# Patient Record
Sex: FEMALE | Race: WHITE | NOT HISPANIC OR LATINO | URBAN - METROPOLITAN AREA
[De-identification: names, ages, dates, MRNs, and addresses within clinical notes are randomized per-mention and may not be internally consistent; named-entity substitution may affect disease eponyms.]

---

## 2023-05-21 ENCOUNTER — WEB ENCOUNTER (OUTPATIENT)
Dept: URBAN - METROPOLITAN AREA CLINIC 60 | Facility: CLINIC | Age: 28
End: 2023-05-21

## 2023-05-24 ENCOUNTER — OFFICE VISIT (OUTPATIENT)
Dept: URBAN - METROPOLITAN AREA CLINIC 60 | Facility: CLINIC | Age: 28
End: 2023-05-24
Payer: COMMERCIAL

## 2023-05-24 VITALS
BODY MASS INDEX: 27.75 KG/M2 | HEART RATE: 108 BPM | SYSTOLIC BLOOD PRESSURE: 124 MMHG | TEMPERATURE: 98 F | OXYGEN SATURATION: 96 % | WEIGHT: 147 LBS | DIASTOLIC BLOOD PRESSURE: 90 MMHG | HEIGHT: 61 IN

## 2023-05-24 DIAGNOSIS — K58.1 IRRITABLE BOWEL SYNDROME WITH CONSTIPATION: ICD-10-CM

## 2023-05-24 PROBLEM — 440630006: Status: ACTIVE | Noted: 2023-05-24

## 2023-05-24 PROCEDURE — 99203 OFFICE O/P NEW LOW 30 MIN: CPT | Performed by: PHYSICIAN ASSISTANT

## 2023-05-24 RX ORDER — LEVONORGESTREL 52 MG/1
AS DIRECTED INTRAUTERINE DEVICE INTRAUTERINE
Status: ACTIVE | COMMUNITY

## 2023-05-24 NOTE — HPI-TODAY'S VISIT:
28-year-old female with no major medical problems presents to the office as a new patient for evaluation of nausea and constipation.  She states over the past couple of years, she has chronic constipation. She will go 2-3 days without having a BM. She sometimes will use MOM or magnesium supplements and will then have diarrhea. She has frequent abdominal bloating and nausea. She will sometimes have low abdominal cramping which is relieved with defecation. She has no melena or hematochezia. Denies unintentional weight loss. She does not use NSAIDs. Labs were done with PCP last month which were normal. No results available. No prior imaging. No prior EGD or colonoscopy. She has not tried any fiber therapy or miralax.   She has no family history of any GI malignancies. Mother has drug induced gastroparesis.

## 2023-07-05 ENCOUNTER — WEB ENCOUNTER (OUTPATIENT)
Dept: URBAN - METROPOLITAN AREA CLINIC 60 | Facility: CLINIC | Age: 28
End: 2023-07-05

## 2023-07-05 ENCOUNTER — OFFICE VISIT (OUTPATIENT)
Dept: URBAN - METROPOLITAN AREA CLINIC 60 | Facility: CLINIC | Age: 28
End: 2023-07-05
Payer: COMMERCIAL

## 2023-07-05 ENCOUNTER — DASHBOARD ENCOUNTERS (OUTPATIENT)
Age: 28
End: 2023-07-05

## 2023-07-05 VITALS
DIASTOLIC BLOOD PRESSURE: 78 MMHG | OXYGEN SATURATION: 99 % | SYSTOLIC BLOOD PRESSURE: 118 MMHG | HEART RATE: 86 BPM | WEIGHT: 146.8 LBS | HEIGHT: 61 IN | BODY MASS INDEX: 27.72 KG/M2 | TEMPERATURE: 97.6 F

## 2023-07-05 DIAGNOSIS — K58.1 IRRITABLE BOWEL SYNDROME WITH CONSTIPATION: ICD-10-CM

## 2023-07-05 PROCEDURE — 99214 OFFICE O/P EST MOD 30 MIN: CPT | Performed by: PHYSICIAN ASSISTANT

## 2023-07-05 RX ORDER — LEVONORGESTREL 52 MG/1
AS DIRECTED INTRAUTERINE DEVICE INTRAUTERINE
Status: ACTIVE | COMMUNITY

## 2023-07-05 NOTE — HPI-TODAY'S VISIT:
28-year-old female with no major medical problems presented to the office in May 2023 with complaints of chronic constipation which had been going on for 2 years.  She reported having a bowel movement every 2 to 3 days.  She reported frequent abdominal bloating and some nausea without vomiting.  She described low abdominal cramping which was relieved with defecation.  She denied any melena or hematochezia.  No unintentional weight loss.  Labs done through her PCP in April 2023 including CBC, CMP, and TSH were unremarkable. High-fiber diet and daily fiber therapy was recommended.  MiraLAX 17 g daily was recommended.  She follows up today using metamucil every day which helped with her abdominal pain and bloating. She used miralax for 7 days but was not sure if she should continue this every day so she stopped taking it. She was having regular BMs with miralax. No abdominal.

## 2024-01-05 NOTE — PHYSICAL EXAM HENT:
Head,  normocephalic,  atraumatic,  Face,  Face within normal limits,  Ears,  External ears within normal limits,  Nose/Nasopharynx,  External nose  normal appearance,  nares patent,  no nasal discharge,  Mouth and Throat,  Oral cavity appearance normal,  Lips,  Appearance normal 
No